# Patient Record
Sex: MALE
[De-identification: names, ages, dates, MRNs, and addresses within clinical notes are randomized per-mention and may not be internally consistent; named-entity substitution may affect disease eponyms.]

---

## 2021-04-24 PROBLEM — Z00.00 ENCOUNTER FOR PREVENTIVE HEALTH EXAMINATION: Status: ACTIVE | Noted: 2021-04-24

## 2021-04-30 ENCOUNTER — APPOINTMENT (OUTPATIENT)
Dept: GASTROENTEROLOGY | Facility: CLINIC | Age: 76
End: 2021-04-30
Payer: MEDICARE

## 2021-04-30 VITALS
WEIGHT: 168 LBS | TEMPERATURE: 97.9 F | OXYGEN SATURATION: 98 % | SYSTOLIC BLOOD PRESSURE: 121 MMHG | BODY MASS INDEX: 27 KG/M2 | HEIGHT: 66 IN | HEART RATE: 63 BPM | DIASTOLIC BLOOD PRESSURE: 77 MMHG

## 2021-04-30 DIAGNOSIS — Z01.818 ENCOUNTER FOR OTHER PREPROCEDURAL EXAMINATION: ICD-10-CM

## 2021-04-30 DIAGNOSIS — R10.13 EPIGASTRIC PAIN: ICD-10-CM

## 2021-04-30 DIAGNOSIS — K21.9 GASTRO-ESOPHAGEAL REFLUX DISEASE W/OUT ESOPHAGITIS: ICD-10-CM

## 2021-04-30 DIAGNOSIS — Z12.11 ENCOUNTER FOR SCREENING FOR MALIGNANT NEOPLASM OF COLON: ICD-10-CM

## 2021-04-30 PROCEDURE — 99204 OFFICE O/P NEW MOD 45 MIN: CPT

## 2021-04-30 PROCEDURE — 99072 ADDL SUPL MATRL&STAF TM PHE: CPT

## 2021-04-30 RX ORDER — SODIUM SULFATE, POTASSIUM SULFATE, MAGNESIUM SULFATE 17.5; 3.13; 1.6 G/ML; G/ML; G/ML
17.5-3.13-1.6 SOLUTION, CONCENTRATE ORAL
Qty: 1 | Refills: 0 | Status: ACTIVE | COMMUNITY
Start: 2021-04-30 | End: 1900-01-01

## 2021-04-30 RX ORDER — FAMOTIDINE 40 MG/1
40 TABLET, FILM COATED ORAL
Qty: 60 | Refills: 3 | Status: ACTIVE | COMMUNITY
Start: 2021-04-30 | End: 1900-01-01

## 2021-04-30 NOTE — ASSESSMENT
[FreeTextEntry1] : Dyspepsia: The patient complains of dyspeptic symptoms.  The patient was advised to abide by an anti-gas diet.  The patient was given a pamphlet for anti-gas.  The patient and I reviewed the anti-gas diet at length. The patient is to start on a trial of Phazyme one tablet 3 times a day p.r.n. abdominal pain and gas.\par GERD: The patient was advised to avoid late-night meals and dietary indiscretions.  The patient was advised to avoid fried and fatty foods.  The patient was advised to abide by an anti-GERD diet. The patient was given a pamphlet for anti-GERD.  The patient and I reviewed the anti-GERD diet at length. I recommend a trial of Pepcid 40 mg twice a day x 3 months for the symptoms.\par If the symptoms persist, the patient may require an upper endoscopy to assess for peptic ulcer disease versus esophagitis.  The patient was told of the risks and benefits of the procedure.  The patient was told of the risks of perforation, emergency surgery, bleeding, infections and missed lesions.  The patient agreed and will follow-up to reassess the symptoms.\par Diverticulosis: I recommend a high-fiber diet and avoid seeds. The patient is to consider a trial of Metamucil once a day for fiber supplementation. The patient is to also consider a trial of a probiotic such as Align once a day. \par Internal Hemorrhoids: The patient is to be started on a trial of Anusol H. C. suppositories one per rectum nightly and Anusol HC2 .5% cream apply to affected area twice a day p.r.n. hemorrhoidal bleeding or pain. \par I recommend a repeat colonoscopy to reassess for colonic polyps pending patient’s health unless symptomatic.  The patient agreed and will follow up for the procedure.\par Hiatal Hernia:  The patient was advised to avoid late-night meals and dietary indiscretions.  The patient was advised to avoid fried and fatty foods.  The patient was advised to abide by an anti-GERD diet. The patient was given a pamphlet for anti-GERD.  The patient and I reviewed the anti-GERD diet at length. I recommend a trial of Pantoprazole 40 mg once a day for 3 months for the symptoms.\par Gastritis: The patient has a history of gastritis. The patient is to avoid nonsteroidal anti-inflammatory drugs and aspirin. I recommend a trial of pantoprazole 40 mg once a day for 3 months for the symptoms. \par Colon Cancer screening: I recommend colonoscopy for colon cancer screening over the age of 45 to assess for colonic polyps. The patient was told of the risks and benefits of the procedure.  The patient was told of the risks of perforation, emergency surgery, bleeding, infections and missed lesions. The patient agreed and will schedule for the procedure. The patient is to be n.p.o. after midnight and bowel prep was given.  The patient is to return for the procedure. \par Colonoscopy: I recommend a colonoscopy to assess the symptoms.  The patient was told of the risks and benefits of the procedure.  The patient was told of the risks of perforation, emergency surgery, bleeding, infections and missed lesions.  The patient agreed and will schedule for the procedure. The patient can take the antihypertensive medication with a sip of water one hour prior to the procedure. The patient is to be n.p.o. after midnight and bowel prep was given.  The patient is to return for the procedure. \par Follow-up: The patient is to follow-up in the office in 4 weeks to reassess the symptoms. The patient was told to call the office if any further problems. \par \par

## 2021-04-30 NOTE — REVIEW OF SYSTEMS
[Eyesight Problems] : eyesight problems [Heartburn] : heartburn [Negative] : Heme/Lymph [FreeTextEntry9] : left lower back pain

## 2021-04-30 NOTE — HISTORY OF PRESENT ILLNESS
[None] : had no significant interval events [Nausea] : denies nausea [Vomiting] : denies vomiting [Diarrhea] : denies diarrhea [Constipation] : denies constipation [Abdominal Pain] : denies abdominal pain [Rectal Pain] : denies rectal pain [Heartburn] : heartburn [Abdominal Swelling] : abdominal swelling [GERD] : gastroesophageal reflux disease [Hiatus Hernia] : hiatus hernia [Wt Gain ___ Lbs] : no recent weight gain [Wt Loss ___ Lbs] : no recent weight loss [Peptic Ulcer Disease] : no peptic ulcer disease [Pancreatitis] : no pancreatitis [Cholelithiasis] : no cholelithiasis [Kidney Stone] : no kidney stone [Inflammatory Bowel Disease] : no inflammatory bowel disease [Irritable Bowel Syndrome] : no irritable bowel syndrome [Diverticulitis] : no diverticulitis [Alcohol Abuse] : no alcohol abuse [Malignancy] : no malignancy [Abdominal Surgery] : no abdominal surgery [Appendectomy] : no appendectomy [Cholecystectomy] : no cholecystectomy [de-identified] : The patient is a 75-year-old  male with past medical history significant for hypertension who was referred to my office by Dr. Cheikh Peng for dyspepsia and gastroesophageal reflux disease. The patient also admits to coming to the office for colon cancer screening.   I was asked to render an opinion for consultation for the above complaints.   The patient states that he is feeling fine.  The patient denies any abdominal pain.  The patient complains of abdominal gas and bloating.  The patient denies any nausea or vomiting.  The patient complains of occasional gastroesophageal reflux disease but denies any dysphagia. The gastroesophageal reflux disease is worse after meals and late at night and in the early morning. The gastroesophageal reflux disease is improved with proton pump inhibitors, H2 blockers and antacids.  The patient denies any atypical chest pain, shortness of breath or palpitations.  The patient denies any diaphoresis. The patient denies any constipation or diarrhea.  The patient has 1 bowel movement a day.  The patient denies a change in bowel habits.  The patient denies a change in caliber of stool.  The patient denies having mucus discharge with the bowel movements.  The patient denies any bright red blood per rectum, melena or hematemesis.  The patient denies any rectal pain or rectal pruritus. The patient denies any weight loss or anorexia.  He denies any fevers or chills.  The patient denies any jaundice or pruritus.  The patient denies any back pain.  The patient had a colonoscopy to the terminal ileum performed at the office on September 15, 2015.  The colonoscopic findings revealed mild left sided diverticulosis and internal hemorrhoids. There were no polyps, masses, AVMs or colitis noted. The patient had an upper endoscopy performed at the office on March 17, 2009. The findings revealed a large hiatal hernia, and mild antral gastritis. The pathology revealed gastric mucosa with mild chemical/reactive gastropathy with minimal intestinal metaplasia with no evidence of dysplasia that was negative for H.pylori and unremarkable esophageal squamous and gastric body mucosa. The patient tolerated the procedures well. The patient denies having a recent upper endoscopy and colonoscopy performed by another gastroenterologist.  The patient denies any significant family history of GI problems.   [de-identified] : (-) smoking, (-) ETOH, (-) IVDA\par

## 2021-05-03 LAB — HEMOCCULT STL QL: NEGATIVE

## 2021-08-05 ENCOUNTER — APPOINTMENT (OUTPATIENT)
Dept: GASTROENTEROLOGY | Facility: HOSPITAL | Age: 76
End: 2021-08-05